# Patient Record
Sex: FEMALE | URBAN - METROPOLITAN AREA
[De-identification: names, ages, dates, MRNs, and addresses within clinical notes are randomized per-mention and may not be internally consistent; named-entity substitution may affect disease eponyms.]

---

## 2019-10-01 ENCOUNTER — HOSPITAL ENCOUNTER (EMERGENCY)
Facility: MEDICAL CENTER | Age: 19
End: 2019-10-01
Attending: EMERGENCY MEDICINE
Payer: COMMERCIAL

## 2019-10-01 VITALS
SYSTOLIC BLOOD PRESSURE: 84 MMHG | HEIGHT: 63 IN | DIASTOLIC BLOOD PRESSURE: 62 MMHG | OXYGEN SATURATION: 98 % | TEMPERATURE: 98.3 F | HEART RATE: 62 BPM | RESPIRATION RATE: 20 BRPM

## 2019-10-01 DIAGNOSIS — F43.21 SITUATIONAL DEPRESSION: ICD-10-CM

## 2019-10-01 LAB
AMPHET UR QL SCN: NEGATIVE
BARBITURATES UR QL SCN: NEGATIVE
BENZODIAZ UR QL SCN: NEGATIVE
BZE UR QL SCN: NEGATIVE
CANNABINOIDS UR QL SCN: NEGATIVE
METHADONE UR QL SCN: NEGATIVE
OPIATES UR QL SCN: NEGATIVE
OXYCODONE UR QL SCN: NEGATIVE
PCP UR QL SCN: NEGATIVE
POC BREATHALIZER: 0.01 PERCENT (ref 0–0.01)
PROPOXYPH UR QL SCN: NEGATIVE

## 2019-10-01 PROCEDURE — 302970 POC BREATHALIZER: Performed by: EMERGENCY MEDICINE

## 2019-10-01 PROCEDURE — 99285 EMERGENCY DEPT VISIT HI MDM: CPT

## 2019-10-01 PROCEDURE — 80307 DRUG TEST PRSMV CHEM ANLYZR: CPT

## 2019-10-01 PROCEDURE — 90791 PSYCH DIAGNOSTIC EVALUATION: CPT

## 2019-10-01 ASSESSMENT — PAIN SCALES - WONG BAKER: WONGBAKER_NUMERICALRESPONSE: DOESN'T HURT AT ALL

## 2019-10-01 NOTE — ED TRIAGE NOTES
Chief Complaint   Patient presents with   • Suicidal Ideation     Suicidal thoughts     Patient here after [employer] placed her on a hold.  Patient has intermittent ideas on harming herself. Patient does not want to die and has no plan. Patient has one bag of personal effects.

## 2019-10-01 NOTE — PROGRESS NOTES
Spiritual Care Note    Patient Information     Patient's Name: Hanna Kang   MRN: [id number]    YOB: 2000   Age and Gender: 19 y.o. female   Service Area: ED C   Room (and Bed):  30/30 [facility]   Ethnicity or Nationality: Not listed    Primary Language: English   Alevism/Spiritual preference: No Gnosticism on file   Place of Residence: Not listed   Family/Friends/Others Present: No   Clinical Team Present: No   Medical Diagnosis(-es)/Procedure(s): Suicidal Ideation   Code Status: No Order    Date of Admission: 10/1/2019   Length of Stay: 0 days        Spiritual Care Provider Information:  Name of Spiritual Care Provider: America Calero  Title of Spiritual Care Provider: Associate   Phone Number: [phone]  E-mail: [email or web address]  Total time : 15 minutes    Spiritual Screen Results:    Gen Nursing        Palliative Care         Encounter/Request Information  Encounter/Request Type   Visited With: Patient  Nature of the Visit: Initial  Continue Visiting: No  General Visit: Yes  Referral From/ Origin of Request: [place] rounds, Verbal patient    Religous Needs/Values  Alevism Needs Visit  Alevism Needs: Prayer  Ritual Needs Visit  Ritual Needs: [place]    Spiritual Assessment     Spiritual Care Encounters    Observations/Symptoms: Accepting, Thankfulness    Interaction/Conversation: Pt stated that she had started crying at a job orientation and was sent to [facility] out of fears that she would hurt herself, but that she will soon go home as she has been determined not to be a danger to herself.  She was very grateful for blessing and prayer for emotional and financial stablity, and repeatedly thanked the .    Assessment: Need    Need: Family Issues/Concern    Interventions: Conversation, Prayer, [place]    Outcomes: Spiritual Comfort, Value/Dignity/Respect, Coping, Connectedness with the Holy/with God  Plan: No Further Visits    Notes:

## 2019-10-01 NOTE — ED PROVIDER NOTES
"3ED Provider Note    Scribed for Karen Mercedes M.D. by Lissy Gee. 10/1/2019  4:36 PM    Primary care provider: No primary care provider on file.  Means of arrival: Ambulance   History obtained from: Patient  History limited by: None     CHIEF COMPLAINT  Chief Complaint   Patient presents with   • Suicidal Ideation     Suicidal thoughts       HPI  Hanna Kang is a 19 y.o. female who presents to the Emergency Department for suicidal ideation onset prior to arrival. The patient recently lost her job at [employer] in the [facility] [employer] [facility] [employer]. The patient states she recently got hired but was asked to leave because she had a poor attitude. She was making statements that she wanted it all to end. The patient is not feeling suicidal at this time and would like to go home.  The patient saw her therapist yesterday and was able to talk through some of her issues. She has not cut herself or attempted suicide in 2 years. The patient is not currently on any medication.    REVIEW OF SYSTEMS  Psychiatric: Positive for depression and anxiety. No suicidal ideation.   See history of present illness. All other systems are negative. C.    PAST MEDICAL HISTORY   has a past medical history of Psychiatric disorder.    SURGICAL HISTORY  patient denies any surgical history    SOCIAL HISTORY  None noted.     FAMILY HISTORY  None noted.     CURRENT MEDICATIONS  Home Medications     Reviewed by Yaakov Gudino RLisNLis (Registered Nurse) on 10/01/19 at 1548  Med List Status: Not Addressed   Medication Last Dose Status        Patient Jamey Taking any Medications                       ALLERGIES  No Known Allergies    PHYSICAL EXAM  VITAL SIGNS: BP (!) 84/62   Pulse 62   Temp 36.8 °C (98.3 °F) (Tympanic)   Resp 20   Ht 1.6 m (5' 3\")   LMP 09/17/2019 (Approximate)   SpO2 98%     Constitutional: Well developed, Well nourished, No acute distress, Non-toxic appearance.   HEENT: Normocephalic, Atraumatic,  external ears normal, " pharynx pink,  Mucous  Membranes moist, No rhinorrhea or mucosal edema  Eyes: PERRL, EOMI, Conjunctiva normal, No discharge.   Neck: Normal range of motion, No tenderness, Supple, No stridor.   Lymphatic: No lymphadenopathy    Cardiovascular: Regular Rate and Rhythm, No murmurs,  rubs, or gallops.   Thorax & Lungs: Lungs clear to auscultation bilaterally, No respiratory distress, No wheezes, rhales or rhonchi, No chest wall tenderness.   Abdomen: Bowel sounds normal, Soft, non tender, non distended,  No pulsatile masses., no rebound guarding or peritoneal signs.   Skin: Warm, Dry, No erythema, No rash,   Back:  No CVA tenderness,  No spinal tenderness, bony crepitance, step offs, or instability.   Neurologic: Alert & oriented x 3, Normal motor function, Normal sensory function, No focal deficits noted. Normal reflexes. Normal Cranial Nerves.  Extremities: Equal, intact distal pulses, No cyanosis, clubbing or edema,  No tenderness.   Musculoskeletal: Good range of motion in all major joints. No tenderness to palpation or major deformities noted.   Psychiatric: She reports depression and anxiety. Does not admit to suicidal ideation.  No fresh cuts on her arms      DIAGNOSTIC STUDIES / PROCEDURES    LABS  Results for orders placed or performed during the hospital encounter of 10/01/19   URINE DRUG SCREEN (TRIAGE)   Result Value Ref Range    Amphetamines Urine Negative Negative    Barbiturates Negative Negative    Benzodiazepines Negative Negative    Cocaine Metabolite Negative Negative    Methadone Negative Negative    Opiates Negative Negative    Oxycodone Negative Negative    Phencyclidine -Pcp Negative Negative    Propoxyphene Negative Negative    Cannabinoid Metab Negative Negative   POC BREATHALIZER   Result Value Ref Range    POC Breathalizer 0.01 0.00 - 0.01 Percent       All labs reviewed by me.    COURSE & MEDICAL DECISION MAKING  Nursing notes, VS, PMSFHx reviewed in chart.    4:36 PM Patient seen and  examined at bedside. Ordered Urine drug screen and POC breathalizer to evaluate her symptoms. The differential diagnoses include but are not limited to: situational depression, anxiety.  The patient will be evaluated be behavioral health.     6:51 PM - The patient was evaluated by behavioral health who does not believe the patient is a danger to herself or others. The legal hold will be lifted at this time. The patient is understanding and agreeable to discharge.          The patient will return for new or worsening symptoms and is stable at the time of discharge.        DISPOSITION:  Patient will be discharged home in stable condition.    FOLLOW UP:  No follow-up provider specified.      FINAL IMPRESSION  1. Situational depression          Lissy JUAN (Scribe), am scribing for, and in the presence of, Karen Mercedes M.D..    Electronically signed by: Lissy Gee (Scribe), 10/1/2019    Karen JUAN M.D. personally performed the services described in this documentation, as scribed by Lissy Gee in my presence, and it is both accurate and complete.  C  The note accurately reflects work and decisions made by me.  Karen Mercedes  10/1/2019  7:42 PM
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
## 2020-08-28 ENCOUNTER — HOSPITAL ENCOUNTER (EMERGENCY)
Dept: HOSPITAL 8 - ED | Age: 20
Discharge: HOME | End: 2020-08-28
Payer: MEDICAID

## 2020-08-28 VITALS — BODY MASS INDEX: 41.02 KG/M2 | HEIGHT: 63 IN | WEIGHT: 231.49 LBS

## 2020-08-28 VITALS — SYSTOLIC BLOOD PRESSURE: 141 MMHG | DIASTOLIC BLOOD PRESSURE: 78 MMHG

## 2020-08-28 DIAGNOSIS — S30.854A: Primary | ICD-10-CM
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
## 2020-08-28 PROCEDURE — 99284 EMERGENCY DEPT VISIT MOD MDM: CPT

## 2020-08-28 NOTE — NUR
TASK RN: DC EDUCATION PROVIDED, PT DEMONSTRATES UNDERSTANDING. PT AMBULATED 
STEADILY TO DC WITH RN.

## 2020-08-28 NOTE — NUR
TASK RN: PELVIC EXAM COMPLETED BY ERP WITH SUCCESSFUL REMOVAL OF TAMPON. THIS 
RN AT BEDSIDE THROUGHOUT. PT TOLERATED WELL. PT OFF MONITORING AND UP TO DRESS. 
AWAITING DC INSTRUCTIONS.